# Patient Record
Sex: FEMALE | Race: WHITE | Employment: UNEMPLOYED | URBAN - METROPOLITAN AREA
[De-identification: names, ages, dates, MRNs, and addresses within clinical notes are randomized per-mention and may not be internally consistent; named-entity substitution may affect disease eponyms.]

---

## 2021-09-14 ENCOUNTER — APPOINTMENT (OUTPATIENT)
Dept: CT IMAGING | Age: 56
End: 2021-09-14
Payer: COMMERCIAL

## 2021-09-14 ENCOUNTER — APPOINTMENT (OUTPATIENT)
Dept: GENERAL RADIOLOGY | Age: 56
End: 2021-09-14
Payer: COMMERCIAL

## 2021-09-14 ENCOUNTER — HOSPITAL ENCOUNTER (EMERGENCY)
Age: 56
Discharge: ANOTHER HOSPITAL | End: 2021-09-15
Attending: EMERGENCY MEDICINE
Payer: COMMERCIAL

## 2021-09-14 DIAGNOSIS — R58 ACUTE BLEEDING: Primary | ICD-10-CM

## 2021-09-14 DIAGNOSIS — R57.9 SHOCK (HCC): ICD-10-CM

## 2021-09-14 LAB
A/G RATIO: 0.9 (ref 1.1–2.2)
ABO/RH: NORMAL
ALBUMIN SERPL-MCNC: 3.4 G/DL (ref 3.4–5)
ALP BLD-CCNC: 110 U/L (ref 40–129)
ALT SERPL-CCNC: 12 U/L (ref 10–40)
ANION GAP SERPL CALCULATED.3IONS-SCNC: 16 MMOL/L (ref 3–16)
ANTIBODY IDENTIFICATION: NORMAL
ANTIBODY IDENTIFICATION: NORMAL
ANTIBODY SCREEN: NORMAL
AST SERPL-CCNC: 9 U/L (ref 15–37)
BASOPHILS ABSOLUTE: 0 K/UL (ref 0–0.2)
BASOPHILS RELATIVE PERCENT: 0.5 %
BILIRUB SERPL-MCNC: 0.3 MG/DL (ref 0–1)
BILIRUBIN URINE: NEGATIVE
BLOOD, URINE: ABNORMAL
BUN BLDV-MCNC: 18 MG/DL (ref 7–20)
CALCIUM SERPL-MCNC: 9.4 MG/DL (ref 8.3–10.6)
CHLORIDE BLD-SCNC: 94 MMOL/L (ref 99–110)
CLARITY: ABNORMAL
CO2: 21 MMOL/L (ref 21–32)
COLOR: ABNORMAL
CREAT SERPL-MCNC: 1.2 MG/DL (ref 0.6–1.1)
DAT IGG CAPTURE: NORMAL
EOSINOPHILS ABSOLUTE: 0 K/UL (ref 0–0.6)
EOSINOPHILS RELATIVE PERCENT: 0.5 %
GFR AFRICAN AMERICAN: 56
GFR NON-AFRICAN AMERICAN: 46
GLOBULIN: 3.7 G/DL
GLUCOSE BLD-MCNC: 269 MG/DL (ref 70–99)
GLUCOSE URINE: NEGATIVE MG/DL
HCT VFR BLD CALC: 20.6 % (ref 36–48)
HCT VFR BLD CALC: 29 % (ref 36–48)
HEMOGLOBIN: 6.9 G/DL (ref 12–16)
HEMOGLOBIN: 9.6 G/DL (ref 12–16)
INR BLD: 0.97 (ref 0.88–1.12)
KETONES, URINE: NEGATIVE MG/DL
LACTIC ACID, SEPSIS: 2.4 MMOL/L (ref 0.4–1.9)
LEUKOCYTE ESTERASE, URINE: ABNORMAL
LYMPHOCYTES ABSOLUTE: 3.3 K/UL (ref 1–5.1)
LYMPHOCYTES RELATIVE PERCENT: 45.1 %
MCH RBC QN AUTO: 30.3 PG (ref 26–34)
MCHC RBC AUTO-ENTMCNC: 33.3 G/DL (ref 31–36)
MCV RBC AUTO: 90.9 FL (ref 80–100)
MICROSCOPIC EXAMINATION: YES
MONOCYTES ABSOLUTE: 0.9 K/UL (ref 0–1.3)
MONOCYTES RELATIVE PERCENT: 11.8 %
NEUTROPHILS ABSOLUTE: 3 K/UL (ref 1.7–7.7)
NEUTROPHILS RELATIVE PERCENT: 42.1 %
NITRITE, URINE: NEGATIVE
PDW BLD-RTO: 16.3 % (ref 12.4–15.4)
PH UA: 7 (ref 5–8)
PLATELET # BLD: 108 K/UL (ref 135–450)
PMV BLD AUTO: 7.7 FL (ref 5–10.5)
POTASSIUM SERPL-SCNC: 3.5 MMOL/L (ref 3.5–5.1)
PROCALCITONIN: 1.61 NG/ML (ref 0–0.15)
PROTEIN UA: >=300 MG/DL
PROTHROMBIN TIME: 10.9 SEC (ref 9.9–12.7)
RBC # BLD: 3.18 M/UL (ref 4–5.2)
RBC UA: >100 /HPF (ref 0–4)
SODIUM BLD-SCNC: 131 MMOL/L (ref 136–145)
SPECIFIC GRAVITY UA: >=1.03 (ref 1–1.03)
TOTAL PROTEIN: 7.1 G/DL (ref 6.4–8.2)
TROPONIN: <0.01 NG/ML
URINE REFLEX TO CULTURE: ABNORMAL
URINE TYPE: ABNORMAL
UROBILINOGEN, URINE: 0.2 E.U./DL
WBC # BLD: 7.2 K/UL (ref 4–11)
WBC UA: ABNORMAL /HPF (ref 0–5)

## 2021-09-14 PROCEDURE — 80053 COMPREHEN METABOLIC PANEL: CPT

## 2021-09-14 PROCEDURE — 86922 COMPATIBILITY TEST ANTIGLOB: CPT

## 2021-09-14 PROCEDURE — 99284 EMERGENCY DEPT VISIT MOD MDM: CPT

## 2021-09-14 PROCEDURE — 6360000002 HC RX W HCPCS: Performed by: EMERGENCY MEDICINE

## 2021-09-14 PROCEDURE — 85018 HEMOGLOBIN: CPT

## 2021-09-14 PROCEDURE — 2500000003 HC RX 250 WO HCPCS: Performed by: EMERGENCY MEDICINE

## 2021-09-14 PROCEDURE — 2580000003 HC RX 258: Performed by: NURSE PRACTITIONER

## 2021-09-14 PROCEDURE — 93005 ELECTROCARDIOGRAM TRACING: CPT | Performed by: NURSE PRACTITIONER

## 2021-09-14 PROCEDURE — 36415 COLL VENOUS BLD VENIPUNCTURE: CPT

## 2021-09-14 PROCEDURE — 84145 PROCALCITONIN (PCT): CPT

## 2021-09-14 PROCEDURE — 96376 TX/PRO/DX INJ SAME DRUG ADON: CPT

## 2021-09-14 PROCEDURE — 36430 TRANSFUSION BLD/BLD COMPNT: CPT

## 2021-09-14 PROCEDURE — 74176 CT ABD & PELVIS W/O CONTRAST: CPT

## 2021-09-14 PROCEDURE — 96365 THER/PROPH/DIAG IV INF INIT: CPT

## 2021-09-14 PROCEDURE — P9016 RBC LEUKOCYTES REDUCED: HCPCS

## 2021-09-14 PROCEDURE — 85610 PROTHROMBIN TIME: CPT

## 2021-09-14 PROCEDURE — 86870 RBC ANTIBODY IDENTIFICATION: CPT

## 2021-09-14 PROCEDURE — 96375 TX/PRO/DX INJ NEW DRUG ADDON: CPT

## 2021-09-14 PROCEDURE — 86905 BLOOD TYPING RBC ANTIGENS: CPT

## 2021-09-14 PROCEDURE — 87040 BLOOD CULTURE FOR BACTERIA: CPT

## 2021-09-14 PROCEDURE — 86900 BLOOD TYPING SEROLOGIC ABO: CPT

## 2021-09-14 PROCEDURE — 81001 URINALYSIS AUTO W/SCOPE: CPT

## 2021-09-14 PROCEDURE — 85025 COMPLETE CBC W/AUTO DIFF WBC: CPT

## 2021-09-14 PROCEDURE — 86901 BLOOD TYPING SEROLOGIC RH(D): CPT

## 2021-09-14 PROCEDURE — 71045 X-RAY EXAM CHEST 1 VIEW: CPT

## 2021-09-14 PROCEDURE — 86850 RBC ANTIBODY SCREEN: CPT

## 2021-09-14 PROCEDURE — 86880 COOMBS TEST DIRECT: CPT

## 2021-09-14 PROCEDURE — 85014 HEMATOCRIT: CPT

## 2021-09-14 PROCEDURE — 84484 ASSAY OF TROPONIN QUANT: CPT

## 2021-09-14 PROCEDURE — 83605 ASSAY OF LACTIC ACID: CPT

## 2021-09-14 PROCEDURE — 6360000002 HC RX W HCPCS: Performed by: NURSE PRACTITIONER

## 2021-09-14 RX ORDER — SODIUM CHLORIDE 9 MG/ML
INJECTION, SOLUTION INTRAVENOUS PRN
Status: DISCONTINUED | OUTPATIENT
Start: 2021-09-14 | End: 2021-09-15 | Stop reason: HOSPADM

## 2021-09-14 RX ORDER — HYDROCODONE BITARTRATE AND ACETAMINOPHEN 5; 325 MG/1; MG/1
TABLET ORAL
COMMUNITY
Start: 2021-08-19

## 2021-09-14 RX ORDER — ONDANSETRON HYDROCHLORIDE 8 MG/1
TABLET, FILM COATED ORAL
COMMUNITY
Start: 2021-07-13

## 2021-09-14 RX ORDER — DEXAMETHASONE 4 MG/1
TABLET ORAL
COMMUNITY
Start: 2021-08-09

## 2021-09-14 RX ORDER — DEXAMETHASONE 4 MG/1
TABLET ORAL
COMMUNITY
Start: 2021-06-11

## 2021-09-14 RX ORDER — SODIUM CHLORIDE, SODIUM LACTATE, POTASSIUM CHLORIDE, CALCIUM CHLORIDE 600; 310; 30; 20 MG/100ML; MG/100ML; MG/100ML; MG/100ML
30 INJECTION, SOLUTION INTRAVENOUS ONCE
Status: COMPLETED | OUTPATIENT
Start: 2021-09-14 | End: 2021-09-14

## 2021-09-14 RX ORDER — FENTANYL CITRATE 50 UG/ML
50 INJECTION, SOLUTION INTRAMUSCULAR; INTRAVENOUS ONCE
Status: COMPLETED | OUTPATIENT
Start: 2021-09-14 | End: 2021-09-14

## 2021-09-14 RX ORDER — SENNOSIDES 8.6 MG
TABLET ORAL
COMMUNITY
Start: 2021-06-17

## 2021-09-14 RX ORDER — FENTANYL CITRATE 50 UG/ML
25 INJECTION, SOLUTION INTRAMUSCULAR; INTRAVENOUS ONCE
Status: COMPLETED | OUTPATIENT
Start: 2021-09-14 | End: 2021-09-14

## 2021-09-14 RX ORDER — LORATADINE 10 MG/1
10 TABLET ORAL DAILY
COMMUNITY

## 2021-09-14 RX ORDER — LIDOCAINE AND PRILOCAINE 25; 25 MG/G; MG/G
CREAM TOPICAL
COMMUNITY
Start: 2021-08-31

## 2021-09-14 RX ORDER — PROCHLORPERAZINE MALEATE 10 MG
TABLET ORAL
COMMUNITY
Start: 2021-07-20

## 2021-09-14 RX ADMIN — Medication 5 MCG/MIN: at 20:05

## 2021-09-14 RX ADMIN — FENTANYL CITRATE 25 MCG: 50 INJECTION, SOLUTION INTRAMUSCULAR; INTRAVENOUS at 23:19

## 2021-09-14 RX ADMIN — SODIUM CHLORIDE, POTASSIUM CHLORIDE, SODIUM LACTATE AND CALCIUM CHLORIDE 1701 ML: 600; 310; 30; 20 INJECTION, SOLUTION INTRAVENOUS at 19:05

## 2021-09-14 RX ADMIN — HYDROCORTISONE SODIUM SUCCINATE 100 MG: 100 INJECTION, POWDER, FOR SOLUTION INTRAMUSCULAR; INTRAVENOUS at 19:04

## 2021-09-14 RX ADMIN — FENTANYL CITRATE 50 MCG: 50 INJECTION, SOLUTION INTRAMUSCULAR; INTRAVENOUS at 22:44

## 2021-09-14 ASSESSMENT — PAIN SCALES - GENERAL
PAINLEVEL_OUTOF10: 7
PAINLEVEL_OUTOF10: 10

## 2021-09-14 NOTE — ED PROVIDER NOTES
tubes enter the skin. R neph bag with blood in it. L neph bag with mostly yellow urine, tiny amount of blood. Colostomy bag with clot and blood in it as well. Abd soft NTND though. Tachycardia, reg rhythm, CTAB, port on chest noted. Vaginal bleeding noted as well. The 12 lead EKG was interpreted by me as follows:  Rate: tachycardia with a rate of 116  Rhythm: sinus  Axis: normal  Intervals: normal DE, narrow QRS, normal QTc  ST segments: no ST elevations or depressions  T waves: no abnormal inversions  Non-specific T wave changes: not present  Prior EKG comparison: No prior is currently available for comparison    MDM:  ED course was notable for hypotension with tachycardia, hemoglobin initially was stable. Hgb here is 9.6, was 9.5 at d/c earlier in the day before this complication. However she had multiple sources of bleeding in the right urostomy bag, colostomy bag and also vaginally. Repeat hemoglobin was notably lower at 6.9 which fits best with her clinical presentation of acute blood loss. She tells me that she has had issues with specific type and screen compatibility issues with blood in the past and given her need for pressor support I did authorize emergency uncrossed match blood for now. Patient was given fluids, Solu-Cortef and started on Levophed via her port. Her findings are not really consistent with sepsis given the acute nature of them. I rechecked her colostomy and urostomy bag which only filled up mildly with small amount of blood compared to my initial assessment at the time the repeat hemoglobin was drawn. CTAP shows distention of the endometrial cavity likely due to obstruction from cervical malignancy, percutaneous nephrostomy catheters and ureteral stents appear to be in place though, with blood in the renal collecting systems ureters and urinary bladder and left hemicolon and rectum.   There is also stranding in the extraperitoneal pelvis, possibly radiotherapy related or blood related. Other incidental findings not related to today's acute presentation also noted on the CT report. Dr. Miguel Durbin from [facility] urology was consulted about the patient's ED history, physical, workup, and course so far. Recommendations from this consultant included they are not the primary admitting service. Dr. Carson Richardson from SICU at [address] was consulted about the patient's ED history, physical, workup, and course so far. Recommendations from this consultant included excepting the patient. They have limited bed capacity but will accommodate a MICU bed for this SICU patient. SEP-1 CORE MEASURE DATA    Classification: exclude from core measure    Exclusion criteria: the patient is NOT to be included for sepsis due to:  Hypotension multifactorial - blood loss, dehydration, adrenal axis suppression all considered, not sepsis. I have discussed with the patient and significant other the rationale for blood component transfusion; its benefits in treating or preventing fatigue, organ damage, or death; and its risk which includes mild transfusion reactions, rare risk of blood borne infection, or more serious but rare reactions. I have discussed the alternatives to transfusion, including the risk and consequences of not receiving transfusion. The patient and spouse had an opportunity to ask questions and had agreed to proceed with transfusion of blood components.     During the patient's ED course, the patient was given:  Medications   norepinephrine (LEVOPHED) 16 mg in dextrose 5% 250 mL infusion (0 mcg/min IntraVENous Stopped 9/14/21 2100)   0.9 % sodium chloride infusion (has no administration in time range)   lactated ringers infusion 1,701 mL (0 mL/kg × 56.7 kg IntraVENous Stopped 9/14/21 2126)   hydrocortisone sodium succinate PF (SOLU-CORTEF) injection 100 mg (100 mg IntraVENous Given 9/14/21 1904)   fentaNYL (SUBLIMAZE) injection 50 mcg (50 mcg IntraVENous Given 9/14/21 2244) fentaNYL (SUBLIMAZE) injection 25 mcg (25 mcg IntraVENous Given 9/14/21 0753)        CLINICAL IMPRESSION  1. Acute bleeding    2. Shock ([address]        DISPOSITION  Mookie Zamora was transferred to [address] [place] [address] in fair condition. The plan is to transfer to [address] [place] [address] at this time because continuity of care. Dr. Jose Celeste (SICU MD) accepted the patient and will take over the patient's care upon patient arrival.      The total critical care time spent while evaluating and treating this patient was 120 minutes. This excludes time spent doing separately billable procedures. This includes time at the bedside, data interpretation, medication management, obtaining critical history from collateral sources if the patient is unable to provide it directly, and physician consultation. Specifics of interventions taken and potentially life-threatening diagnostic considerations are listed above in the medical decision making. This chart was created using Dragon dictation software. Efforts were made by me to ensure accuracy, however some errors may be present due to limitations of this technology.            Cosmo Tomas MD  09/15/21 0000

## 2021-09-14 NOTE — ED PROVIDER NOTES
[facility]        Pt Name: Crissy Mendoza  MRN: [id number]  [place] 1965  Date of evaluation: 9/14/2021  Provider: DAVID Ibarra CNP  PCP: No primary care provider on file. Note Started: 6:46 PM EDT        I have seen and evaluated this patient with my supervising physician Lizet Goodman MD.    [address] [place] [address]       Chief Complaint   Patient presents with    Hematuria     Pt from home via [facility] was just released from [address] [place] [address] for kidney CA, had colostomy and SOCO drains placed, noticed blood in urine/SOCO drain        HISTORY OF PRESENT ILLNESS   (Location, Timing/Onset, Context/Setting, Quality, Duration, Modifying Factors, Severity, Associated Signs and Symptoms)  Note limiting factors. Chief Complaint: blood from right nephrostomy tube     Crissy Mendoza is a 64 y.o. female who presents to the ER after being home from [address] [place] [address] for about 1.5 hours with report of blood coming from the right nephrostomy tube. Patient reports that she is under treatment or cervical cancer with last chemotherapy about three weeks ago. 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 Patient is pale and very weak, hypotensive and tachycardiac. She is diaphoretic. Ill-appearing. Nursing Notes were all reviewed and agreed with or any disagreements were addressed in the HPI. REVIEW OF SYSTEMS    (2-9 systems for level 4, 10 or more for level 5)     Review of Systems    Positives and Pertinent negatives as per HPI. Except as noted above in the ROS, all other systems were reviewed and negative.        PAST MEDICAL HISTORY     Past Medical History:   Diagnosis Date    Colostomy in place [facility])     History of kidney cancer          SURGICAL HISTORY     Past Surgical History:   Procedure Laterality Date     SECTION           CURRENTMEDICATIONS       Previous Medications    DEXAMETHASONE (DECADRON) 4 MG TABLET        DICLOFENAC SODIUM (VOLTAREN) 1 % GEL    Apply 4 g topically 4 times daily    FLOVENT  MCG/ACT INHALER    INHALE 2 PUFFS INTO THE LUNGS TWICE DAILY    HYDROCODONE-ACETAMINOPHEN (NORCO) 5-325 MG PER TABLET    TAKE 1 TABLET BY MOUTH EVERY 4 HOURS AS NEEDED FOR CANCER PAIN    LIDOCAINE-PRILOCAINE (EMLA) 2.5-2.5 % CREAM    APPLY A PEA SIZED AMOUNT TO VULVA AS NEEDED FOR DISCOMFORT    LORATADINE (CLARITIN) 10 MG TABLET    Take 10 mg by mouth daily    ONDANSETRON (ZOFRAN) 8 MG TABLET    TAKE 1 TABLET BY MOUTH EVERY 8 HOURS AS NEEDED FOR NAUSEA. DO NOT TAKE WITHIN 5 DAYS OF CHEMOTHERAPY    PROCHLORPERAZINE (COMPAZINE) 10 MG TABLET    TAKE 1 TABLET BY MOUTH EVERY 6 HOURS AS NEEDED FOR NAUSEA OR VOMITING    SENNA (SENOKOT) 8.6 MG TABS TABLET    TAKE 1 TO 4 TABLETS BY MOUTH DAILY AS NEEDED FOR CONSTIPATION         ALLERGIES     Patient has no known allergies. FAMILYHISTORY     History reviewed. No pertinent family history.        SOCIAL HISTORY       Social History     Tobacco Use    Smoking status: Not on file   Substance Use Topics    Alcohol use: Not on file    Drug use: Not on file       SCREENINGS             PHYSICAL EXAM    (up to 7 for level 4, 8 or more for level 5)     ED Triage Vitals [21 1835]   BP Temp Temp src Pulse Resp SpO2 Height Weight   (!) 75/44 97 °F (36.1 °C) -- 116 18 99 % -- 125 lb (56.7 kg)       Physical Exam    DIAGNOSTIC RESULTS   LABS:    Labs Reviewed   CBC WITH AUTO DIFFERENTIAL - Abnormal; Notable for the following components:       Result Value    RBC 3.18 (*)     Hemoglobin 9.6 (*)     Hematocrit 29.0 (*)     RDW 16.3 (*)     Platelets 589 (*)     All other components within normal limits    Narrative:     Performed at:  [facility] [address], [place], [address]   Phone [phone] COMPREHENSIVE METABOLIC PANEL - Abnormal; Notable for the following components:    Sodium 131 (*)     Chloride 94 (*)     Glucose 269 (*)     CREATININE 1.2 (*)     GFR Non- 46 (*)     GFR African American 56 (*)     Albumin/Globulin Ratio 0.9 (*)     AST 9 (*)     All other components within normal limits    Narrative:     Performed at:  [facility] [address] [place]   Phone [phone]   URINE RT REFLEX TO CULTURE - Abnormal; Notable for the following components:    Color, UA RED (*)     Clarity, UA TURBID (*)     Blood, Urine LARGE (*)     Protein, UA >=300 (*)     Leukocyte Esterase, Urine LARGE (*)     All other components within normal limits    Narrative:     Performed at:  [facility] [address] [facility] [employer]   Phone [phone]   LACTATE, SEPSIS - Abnormal; Notable for the following components:    Lactic Acid, Sepsis 2.4 (*)     All other components within normal limits    Narrative:     Performed at:  [facility] [address] [employer]   Phone [phone]   PROCALCITONIN - Abnormal; Notable for the following components:    Procalcitonin 1.61 (*)     All other components within normal limits    Narrative:     Performed at:  [facility] [address] [employer]   Phone [phone]   MICROSCOPIC URINALYSIS - Abnormal; Notable for the following components:    WBC, UA see below (*)     RBC, UA >100 (*)     All other components within normal limits    Narrative:     Performed at:  [facility] [address] [employer]   Phone [phone]   HEMOGLOBIN AND HEMATOCRIT, BLOOD - Abnormal; Notable for the following components:    Hemoglobin 6.9 (*)     Hematocrit 20.6 (*)     All other components within normal limits    Narrative:     CALL  [facility] tel. [phone],  Hematology results called to and read back by Neyda Ramos RN, 09/14/2021  21:42, by Nani Rosario  Performed at:  [facility]  [place], [place]   Phone [phone]   CULTURE, BLOOD 1   CULTURE, BLOOD 2   PROTIME-INR    Narrative:     Performed at:  [facility]  [place], [place]   Phone [phone]   TROPONIN    Narrative:     Performed at:  [facility] [email or web address], [place]   Phone [phone]   LACTATE, SEPSIS   TYPE AND SCREEN    Narrative:     Performed at:  [facility]  [place], [place]   Phone [phone]   MODE IGG    Narrative:     Performed at:  [facility]  [employer]   Phone [phone]   ANTIBODY IDENTIFICATION    Narrative:     Performed at:  [facility] [address]   Phone [phone]   PREPARE RBC (CROSSMATCH)       When ordered only abnormal lab results are displayed. All other labs were within normal range or not returned as of this dictation. EKG: When ordered, EKG's are interpreted by the Emergency Department Physician in the absence of a cardiologist.  Please see their note for interpretation of EKG. RADIOLOGY:   Non-plain film images such as CT, Ultrasound and MRI are read by the radiologist. Plain radiographic images are visualized and preliminarily interpreted by the ED Provider with the below findings:        Interpretation per the Radiologist below, if available at the time of this note:    CT ABDOMEN PELVIS WO CONTRAST Additional Contrast? None   Final Result   1. Distention of the endometrial cavity likely due to obstruction from a   cervical malignancy with fiducial markers in place.    2. Bilateral percutaneous nephrostomy catheters and right ureteral stent in   expected position. A left ureteral stent is also in place with the distal   portion extending superiorly through the urinary bladder dome into the mid   transverse colon, where the pigtail loop is located. 3. Blood within the renal collecting systems, ureters, urinary bladder, left   hemicolon, and rectum potentially of genitourinary or bowel origin. 4. Stranding throughout the extraperitoneal pelvis may be a sequela of   radiotherapy, although blood could appear similar. 5. New areas of subcapsular fluid density associated with the liver and   spleen as above could represent cysts or pseudocysts. Peritoneal   carcinomatosis in the setting of a mucinous tumor could also be considered,   although no similar findings are seen elsewhere in the peritoneal cavity. 6. New 4.0 cm x 2.0 cm partially calcified soft tissue density along the deep   portion of a Pfannenstiel incision could be related to a metastasis,   fibromatosis (desmoid), or incisional endometriosis. 7. A 0.5 cm groundglass nodule in the left lower lobe may be infectious,   inflammatory, or neoplastic in etiology. Recommend attention on follow-up   imaging. XR CHEST PORTABLE   Final Result   Unremarkable portable chest radiograph. No results found. PROCEDURES   Unless otherwise noted below, none     Procedures    CRITICAL CARE TIME   The total critical care time spent while evaluating and treating this patient was at least 120 minutes. This excludes time spent doing separately billable procedures. This includes time at the bedside, data interpretation, medication management, obtaining critical history from collateral sources if the patient is unable to provide it directly, and physician consultation. Specifics of interventions taken and potentially life-threatening diagnostic considerations are listed above in the medical decision making.       CONSULTS:  [facility]      EMERGENCY DEPARTMENT COURSE and DIFFERENTIAL DIAGNOSIS/MDM:   Vitals:    Vitals:    09/14/21 2222 09/14/21 2230 09/14/21 2325 09/14/21 2330   BP: (!) 170/145 (!) 92/53 114/73 112/79   Pulse: 125 113  106   Resp: 18 20     Temp: 98 °F (36.7 °C)      SpO2: 100% 98%     Weight:           Patient was given the following medications:  Medications   norepinephrine (LEVOPHED) 16 mg in dextrose 5% 250 mL infusion (0 mcg/min IntraVENous Stopped 9/14/21 2100)   0.9 % sodium chloride infusion (has no administration in time range)   lactated ringers infusion 1,701 mL (0 mL/kg × 56.7 kg IntraVENous Stopped 9/14/21 2126)   hydrocortisone sodium succinate PF (SOLU-CORTEF) injection 100 mg (100 mg IntraVENous Given 9/14/21 1904)   fentaNYL (SUBLIMAZE) injection 50 mcg (50 mcg IntraVENous Given 9/14/21 2244)   fentaNYL (SUBLIMAZE) injection 25 mcg (25 mcg IntraVENous Given 9/14/21 2319)           Briefly, this is a 64year old female that presents to the ER with complaint of bleeding from right sided nephrostomy tube, colostomy, an vaginal bleeding that began abruptly about 1.5 hours after arrival.    Patient reports that she was getting up to go from the living room to bedroom when she felt an immediate sharp pain in the right side of her back. Reports that the pain waxed and waned. She did arrive very ill, pale and diaphoretic. She was tachycardiac and hypotensive. Her Port was accessed and she was given sepsis fluid bolus, solu-cortef (she is on chronic steroids), and started on levophed. She did have a large amount of bright red blood in the right nephrostomy tube, colostomy, and 2 episodes of bright red blood from the vagina with clots. We did recheck her hemoglobin and it had dropped from 9.6 to 6.9 after only a few hours in the ER. We did order to infuse emergency uncrossed matched blood.     CTAP shows distention of the endometrial cavity likely due to obstruction from cervical malignancy, percutaneous nephrostomy catheters and ureteral stents appear to be in place though, with blood in the renal collecting systems ureters and urinary bladder and left hemicolon and rectum. There is also stranding in the extraperitoneal pelvis, possibly radiotherapy related or blood related. Other incidental findings not related to today's acute presentation also noted on the CT report. Dr. Freddy Martino did accept this patient to the SICU at [facility]. FINAL IMPRESSION      1. Acute bleeding    2. Shock [facility])          DISPOSITION/PLAN   DISPOSITION Decision To Transfer 09/14/2021 10:50:12 PM      PATIENT REFERRED TO:  No follow-up provider specified.     DISCHARGE MEDICATIONS:  New Prescriptions    No medications on file       DISCONTINUED MEDICATIONS:  Discontinued Medications    No medications on file              (Please note that portions of this note were completed with a voice recognition program.  Efforts were made to edit the dictations but occasionally words are mis-transcribed.)    DAVID Norwood CNP (electronically signed)           DAVID Norwood CNP  09/15/21 0006

## 2021-09-15 VITALS
HEART RATE: 96 BPM | OXYGEN SATURATION: 99 % | TEMPERATURE: 98 F | RESPIRATION RATE: 19 BRPM | WEIGHT: 125 LBS | SYSTOLIC BLOOD PRESSURE: 115 MMHG | BODY MASS INDEX: 20.8 KG/M2 | DIASTOLIC BLOOD PRESSURE: 73 MMHG

## 2021-09-15 LAB
BLOOD BANK DISPENSE STATUS: NORMAL
BLOOD BANK PRODUCT CODE: NORMAL
BPU ID: NORMAL
C (BIG) ANTIGEN: NORMAL
DESCRIPTION BLOOD BANK: NORMAL
EKG ATRIAL RATE: 116 BPM
EKG DIAGNOSIS: NORMAL
EKG P AXIS: 71 DEGREES
EKG P-R INTERVAL: 142 MS
EKG Q-T INTERVAL: 334 MS
EKG QRS DURATION: 72 MS
EKG QTC CALCULATION (BAZETT): 464 MS
EKG R AXIS: 84 DEGREES
EKG T AXIS: 52 DEGREES
EKG VENTRICULAR RATE: 116 BPM
EMERGENCY ISSUE BLOOD PRODUCTS SIGNED FORM: NORMAL

## 2021-09-15 PROCEDURE — 93010 ELECTROCARDIOGRAM REPORT: CPT | Performed by: INTERNAL MEDICINE

## 2021-09-15 PROCEDURE — 6360000002 HC RX W HCPCS: Performed by: NURSE PRACTITIONER

## 2021-09-15 PROCEDURE — 96376 TX/PRO/DX INJ SAME DRUG ADON: CPT

## 2021-09-15 RX ORDER — FENTANYL CITRATE 50 UG/ML
25 INJECTION, SOLUTION INTRAMUSCULAR; INTRAVENOUS ONCE
Status: COMPLETED | OUTPATIENT
Start: 2021-09-15 | End: 2021-09-15

## 2021-09-15 RX ADMIN — FENTANYL CITRATE 25 MCG: 50 INJECTION, SOLUTION INTRAMUSCULAR; INTRAVENOUS at 01:10

## 2021-09-15 ASSESSMENT — PAIN SCALES - GENERAL
PAINLEVEL_OUTOF10: 8
PAINLEVEL_OUTOF10: 6

## 2021-09-18 LAB
BLOOD CULTURE, ROUTINE: NORMAL
CULTURE, BLOOD 2: NORMAL